# Patient Record
Sex: MALE | Race: WHITE | NOT HISPANIC OR LATINO | URBAN - METROPOLITAN AREA
[De-identification: names, ages, dates, MRNs, and addresses within clinical notes are randomized per-mention and may not be internally consistent; named-entity substitution may affect disease eponyms.]

---

## 2021-08-04 ENCOUNTER — OFFICE VISIT - RIVER FALLS (OUTPATIENT)
Dept: FAMILY MEDICINE | Facility: CLINIC | Age: 46
End: 2021-08-04

## 2021-08-04 ASSESSMENT — MIFFLIN-ST. JEOR: SCORE: 1507.48

## 2022-02-12 VITALS
TEMPERATURE: 97.9 F | BODY MASS INDEX: 25.66 KG/M2 | HEART RATE: 88 BPM | RESPIRATION RATE: 16 BRPM | WEIGHT: 154 LBS | SYSTOLIC BLOOD PRESSURE: 120 MMHG | DIASTOLIC BLOOD PRESSURE: 92 MMHG | HEIGHT: 65 IN

## 2022-02-15 NOTE — NURSING NOTE
Pt preemployment Px forms faxed to Loreta Carrillo at [facility] [phone].  confirmation received.  Goran Vu CMA

## 2022-02-15 NOTE — PROGRESS NOTES
Patient:   LEONEL SHANE            MRN: [id number]            FIN: [id number]               Age:   45 years     Sex:  Male     :  1975   Associated Diagnoses:   Pre-employment examination   Author:   Dharmesh Douglass PA-C      Subjective   Here for pre-employment exam for [facility] as a   no concerns today  no risk factors for TB    he has hx of HTN and DM      Health Status   Allergies:    Allergic Reactions (Selected)  No Known Medication Allergies   Medications:  (Selected)   Documented Medications  Documented  Co Q-10: ( 100 mg ), Oral, daily, 0 Refill(s), Type: Maintenance  Daily Multiple Vitamins: Oral, daily, 0 Refill(s), Type: Maintenance  metFORMIN: Oral, 0 Refill(s), Type: Maintenance   Problem list:    All Problems  Resolved: H/O: chickenpox / SNOMED CT [id number]      Chief Complaint   2021 2:33 PM CDT     New pt here for a Preemployment Px for the [facility].      Histories   Past Medical History:    Resolved  H/O: chickenpox ([id number]):  Resolved.   Family History:    Diabetes  Grandmother (M)     Procedure history:    Appendectomy ([id number]).   Social History:        Electronic Cigarette/Vaping Assessment            Electronic Cigarette Use: Never.      Tobacco Assessment            Never (less than 100 in lifetime)        Objective       Vital Signs   2021 2:33 PM CDT Temperature Tympanic 97.9 DegF    Peripheral Pulse Rate 88 bpm    Pulse Site Radial artery    Respiratory Rate 16 br/min    Systolic Blood Pressure 120 mmHg    Diastolic Blood Pressure 92 mmHg  HI    Mean Arterial Pressure 101 mmHg    BP Site Right arm      Measurements from flowsheet : Measurements   2021 2:33 PM CDT Height Measured - Standard 64.5 in    Weight Measured - Standard 154 lb    BSA 1.78 m2    Body Mass Index 26.02 kg/m2  HI      General:  No acute distress.    Eye:  Pupils are equal, round and reactive to light, Extraocular movements are intact.    HENT:   Tympanic membranes are clear, No sinus tenderness.    Neck:  Supple, Non-tender, No lymphadenopathy.    Respiratory:  Lungs are clear to auscultation.    Cardiovascular:  Normal rate, Regular rhythm, No murmur.    Gastrointestinal:  Soft, Non-tender, Non-distended, Normal bowel sounds, No organomegaly.    Genitourinary:  testicles smooth symmetrical, without nodules, no rashes or lesions, no hernia present.    Musculoskeletal:  Normal range of motion.    Neurologic:  Cranial Nerves II-XII are grossly intact, Normal deep tendon reflexes.    Psychiatric:  Appropriate mood & affect.       Results Review   General results      Plan   Impression and Plan:     Diagnosis     Pre-employment examination ([id number] Z02.1).     .    Condition Back screen done at PT indicates low risk for future episode of back pain, Form completed and signed.  Approved without restrictions   , and no risk factors for TB.    Orders     Orders   Charges (Evaluation and Management):  [id number] initial preventive medicine new patient 40-64yrs (Charge) (Order): Quantity: 1, Pre-employment examination.     .

## 2022-02-15 NOTE — NURSING NOTE
Comprehensive Intake Entered On:  8/4/2021 2:46 PM CDT    Performed On:  8/4/2021 2:33 PM CDT by Goran Vu CMA               Summary   Chief Complaint :   New pt here for a Preemployment Px for the [facility].   Weight Measured :   154 lb(Converted to: 154 lb 0 oz, 69.853 kg)    Height Measured :   64.5 in(Converted to: 5 ft 4 in, 163.83 cm)    Body Mass Index :   26.02 kg/m2 (HI)    Body Surface Area :   1.78 m2   Systolic Blood Pressure :   120 mmHg   Diastolic Blood Pressure :   92 mmHg (HI)    Mean Arterial Pressure :   101 mmHg   Peripheral Pulse Rate :   88 bpm   BP Site :   Right arm   Pulse Site :   Radial artery   Temperature Tympanic :   97.9 DegF(Converted to: 36.6 DegC)    Respiratory Rate :   16 br/min   Goran Vu CMA - 8/4/2021 2:33 PM CDT   Health Status   Allergies Verified? :   Yes   Medication History Verified? :   Yes   Medical History Verified? :   Yes   Pre-Visit Planning Status :   Not completed   Tobacco Use? :   Never smoker   Goran Vu CMA - 8/4/2021 2:33 PM CDT   Meds / Allergies   (As Of: 8/4/2021 2:46:41 PM CDT)   Allergies (Active)   No Known Medication Allergies  Estimated Onset Date:   Unspecified ; Created By:   Goran Vu CMA; Reaction Status:   Active ; Category:   Drug ; Substance:   No Known Medication Allergies ; Type:   Allergy ; Updated By:   Goran Vu CMA; Reviewed Date:   8/4/2021 2:37 PM CDT        Medication List   (As Of: 8/4/2021 2:46:41 PM CDT)   Home Meds    ubiquinone  :   ubiquinone ; Status:   Documented ; Ordered As Mnemonic:   Co Q-10 ; Simple Display Line:   100 mg, Oral, daily, 0 Refill(s) ; Catalog Code:   ubiquinone ; Order Dt/Tm:   8/4/2021 2:37:13 PM CDT          metFORMIN  :   metFORMIN ; Status:   Documented ; Ordered As Mnemonic:   metFORMIN ; Simple Display Line:   Oral, 0 Refill(s) ; Catalog Code:   metFORMIN ; Order Dt/Tm:   8/4/2021 2:36:30 PM CDT          multivitamin  :   multivitamin ; Status:   Documented ;  Ordered As Mnemonic:   Daily Multiple Vitamins ; Simple Display Line:   Oral, daily, 0 Refill(s) ; Catalog Code:   multivitamin ; Order Dt/Tm:   8/4/2021 2:36:52 PM CDT            Procedures / Surgeries        -    Procedure History   (As Of: 8/4/2021 2:46:41 PM CDT)     Anesthesia Minutes:   0 ; Procedure Name:   Appendectomy ; Procedure Minutes:   0 ; Last Reviewed Dt/Tm:   8/4/2021 2:38:32 PM CDT            Family History   Family History   (As Of: 8/4/2021 2:46:41 PM CDT)   Grandmother (M):   Relation:   Grandmother (M) ;           Nomenclature:   Diabetes ; Value:   Positive            Social History   Social History   (As Of: 8/4/2021 2:46:41 PM CDT)   Tobacco:        Never (less than 100 in lifetime)   (Last Updated: 8/4/2021 2:37:43 PM CDT by Goran Vu CMA)          Electronic Cigarette/Vaping:        Electronic Cigarette Use: Never.   (Last Updated: 8/4/2021 2:37:46 PM CDT by Goran Vu CMA)